# Patient Record
Sex: FEMALE | Race: WHITE | NOT HISPANIC OR LATINO
[De-identification: names, ages, dates, MRNs, and addresses within clinical notes are randomized per-mention and may not be internally consistent; named-entity substitution may affect disease eponyms.]

---

## 2024-10-16 ENCOUNTER — DASHBOARD ENCOUNTERS (OUTPATIENT)
Age: 54
End: 2024-10-16

## 2024-10-16 ENCOUNTER — LAB OUTSIDE AN ENCOUNTER (OUTPATIENT)
Dept: URBAN - NONMETROPOLITAN AREA CLINIC 4 | Facility: CLINIC | Age: 54
End: 2024-10-16

## 2024-10-16 ENCOUNTER — OFFICE VISIT (OUTPATIENT)
Dept: URBAN - NONMETROPOLITAN AREA CLINIC 4 | Facility: CLINIC | Age: 54
End: 2024-10-16
Payer: COMMERCIAL

## 2024-10-16 VITALS
WEIGHT: 152 LBS | HEART RATE: 73 BPM | BODY MASS INDEX: 26.93 KG/M2 | DIASTOLIC BLOOD PRESSURE: 70 MMHG | SYSTOLIC BLOOD PRESSURE: 103 MMHG | HEIGHT: 63 IN | TEMPERATURE: 97.1 F

## 2024-10-16 DIAGNOSIS — K62.5 RECTAL BLEEDING: ICD-10-CM

## 2024-10-16 DIAGNOSIS — Z12.11 SCREENING FOR COLON CANCER: ICD-10-CM

## 2024-10-16 DIAGNOSIS — K64.4 HEMORRHOIDS, EXTERNAL: ICD-10-CM

## 2024-10-16 PROBLEM — 23913003: Status: ACTIVE | Noted: 2024-10-16

## 2024-10-16 PROCEDURE — 99203 OFFICE O/P NEW LOW 30 MIN: CPT | Performed by: REGISTERED NURSE

## 2024-10-16 RX ORDER — ALPRAZOLAM 1 MG/1
TAKE ONE TABLET BY MOUTH EVERY 12 HOURS AS NEEDED TABLET ORAL
Qty: 60 UNSPECIFIED | Refills: 2 | Status: ACTIVE | COMMUNITY

## 2024-10-16 RX ORDER — CITALOPRAM HYDROBROMIDE 20 MG/1
TAKE ONE TABLET BY MOUTH ONE TIME DAILY TABLET ORAL
Qty: 30 UNSPECIFIED | Refills: 1 | Status: ACTIVE | COMMUNITY

## 2024-10-16 NOTE — HPI-TODAY'S VISIT:
10/16/24: Pt is a 55 yo female with PMH of anxiety/depression who was referred by Dr. Mark Griggs for evaluation of hemorrhoids.  A copy of this document will be sent to the referring physician.   Pt reports painful hemorrhoid(s) over the past week. She has noticed blood on toliet paper twice. Denies constipation or diarrhea. She reports having more intercouse recently. Denies anal trauma. She also rides horses daily. Has tried Proctofoam with little relief. Patient has never had a screening colonoscopy. There is no family history of colon polyps or cancer. Patient denies change in bowel habits, appetite and weight. Patient denies cardiopulmonary disease, intake of blood thinners or problems with anesthesia. Recent blood work normal.

## 2024-10-16 NOTE — PHYSICAL EXAM RECTAL:
normal tone, external hemorrhoids present, possible early thrombosed hemorrhoid, no masses palpable,  no red blood

## 2024-10-16 NOTE — PHYSICAL EXAM CONSTITUTIONAL:
FYI-see my note from today well developed, well nourished , in no acute distress , ambulating without difficulty , normal communication ability

## 2024-10-21 ENCOUNTER — TELEPHONE ENCOUNTER (OUTPATIENT)
Dept: URBAN - NONMETROPOLITAN AREA CLINIC 4 | Facility: CLINIC | Age: 54
End: 2024-10-21

## 2024-11-27 ENCOUNTER — OFFICE VISIT (OUTPATIENT)
Dept: URBAN - METROPOLITAN AREA SURGERY CENTER 13 | Facility: SURGERY CENTER | Age: 54
End: 2024-11-27

## 2024-12-12 ENCOUNTER — OFFICE VISIT (OUTPATIENT)
Dept: URBAN - NONMETROPOLITAN AREA CLINIC 4 | Facility: CLINIC | Age: 54
End: 2024-12-12

## 2025-01-08 ENCOUNTER — OFFICE VISIT (OUTPATIENT)
Dept: URBAN - METROPOLITAN AREA SURGERY CENTER 13 | Facility: SURGERY CENTER | Age: 55
End: 2025-01-08
Payer: COMMERCIAL

## 2025-01-08 ENCOUNTER — LAB OUTSIDE AN ENCOUNTER (OUTPATIENT)
Dept: URBAN - METROPOLITAN AREA SURGERY CENTER 13 | Facility: SURGERY CENTER | Age: 55
End: 2025-01-08

## 2025-01-08 ENCOUNTER — TELEPHONE ENCOUNTER (OUTPATIENT)
Dept: URBAN - METROPOLITAN AREA CLINIC 82 | Facility: CLINIC | Age: 55
End: 2025-01-08

## 2025-01-08 ENCOUNTER — CLAIMS CREATED FROM THE CLAIM WINDOW (OUTPATIENT)
Dept: URBAN - METROPOLITAN AREA CLINIC 4 | Facility: CLINIC | Age: 55
End: 2025-01-08
Payer: COMMERCIAL

## 2025-01-08 DIAGNOSIS — K63.5 BENIGN COLON POLYP: ICD-10-CM

## 2025-01-08 DIAGNOSIS — Z12.11 COLON CANCER SCREENING: ICD-10-CM

## 2025-01-08 DIAGNOSIS — D12.2 BENIGN NEOPLASM OF ASCENDING COLON: ICD-10-CM

## 2025-01-08 DIAGNOSIS — D12.2 ADENOMA OF ASCENDING COLON: ICD-10-CM

## 2025-01-08 DIAGNOSIS — K63.89 OTHER SPECIFIED DISEASES OF INTESTINE: ICD-10-CM

## 2025-01-08 DIAGNOSIS — D12.2 ADENOMATOUS POLYP OF ASCENDING COLON: ICD-10-CM

## 2025-01-08 DIAGNOSIS — K62.89 OTHER SPECIFIED DISEASES OF ANUS AND RECTUM: ICD-10-CM

## 2025-01-08 PROCEDURE — 45380 COLONOSCOPY AND BIOPSY: CPT | Performed by: INTERNAL MEDICINE

## 2025-01-08 PROCEDURE — 45381 COLONOSCOPY SUBMUCOUS NJX: CPT | Performed by: INTERNAL MEDICINE

## 2025-01-08 PROCEDURE — 88305 TISSUE EXAM BY PATHOLOGIST: CPT | Performed by: PATHOLOGY

## 2025-01-08 PROCEDURE — 00811 ANES LWR INTST NDSC NOS: CPT | Performed by: ANESTHESIOLOGY

## 2025-01-08 PROCEDURE — 45385 COLONOSCOPY W/LESION REMOVAL: CPT | Performed by: INTERNAL MEDICINE

## 2025-01-08 PROCEDURE — 00811 ANES LWR INTST NDSC NOS: CPT | Performed by: ANESTHESIOLOGIST ASSISTANT

## 2025-01-08 RX ORDER — POLYETHYLENE GLYCOL-3350 AND ELECTROLYTES WITH FLAVOR PACK 240; 5.84; 2.98; 6.72; 22.72 G/278.26G; G/278.26G; G/278.26G; G/278.26G; G/278.26G
AS DIRECTED POWDER, FOR SOLUTION ORAL
Qty: 1 | Refills: 0 | OUTPATIENT
Start: 2025-01-08 | End: 2025-01-09

## 2025-01-08 RX ORDER — ALPRAZOLAM 1 MG/1
TAKE ONE TABLET BY MOUTH EVERY 12 HOURS AS NEEDED TABLET ORAL
Qty: 60 UNSPECIFIED | Refills: 2 | Status: ACTIVE | COMMUNITY

## 2025-01-08 RX ORDER — CITALOPRAM HYDROBROMIDE 20 MG/1
TAKE ONE TABLET BY MOUTH ONE TIME DAILY TABLET ORAL
Qty: 30 UNSPECIFIED | Refills: 1 | Status: ACTIVE | COMMUNITY

## 2025-01-14 ENCOUNTER — TELEPHONE ENCOUNTER (OUTPATIENT)
Dept: URBAN - NONMETROPOLITAN AREA CLINIC 4 | Facility: CLINIC | Age: 55
End: 2025-01-14

## 2025-01-17 ENCOUNTER — TELEPHONE ENCOUNTER (OUTPATIENT)
Dept: URBAN - NONMETROPOLITAN AREA CLINIC 4 | Facility: CLINIC | Age: 55
End: 2025-01-17

## 2025-01-23 ENCOUNTER — OFFICE VISIT (OUTPATIENT)
Dept: URBAN - NONMETROPOLITAN AREA CLINIC 4 | Facility: CLINIC | Age: 55
End: 2025-01-23